# Patient Record
(demographics unavailable — no encounter records)

---

## 2024-11-19 NOTE — HISTORY OF PRESENT ILLNESS
[7] : 7 [Full time] : Work status: full time [de-identified] : 11/19/2024 Mr. 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 Experiencing sharp and tight pain for 2.5 months. Plays softball about 2x/wk, throws with RT arm. Difficulty with fully extending the right elbow.  Has tried Naproxen with no relief.   Denies n/t or pain radiating throughout the UE.  [] : no [FreeTextEntry1] : RT elbow  [de-identified] :

## 2024-11-19 NOTE — DISCUSSION/SUMMARY
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 1) start physical therapy 2) cryotherapy, rest and activity modification 3) if no improvement will eval with MRI    Entered by Allison Garcia acting as scribe. Dr. Wood- The documentation recorded by the scribe accurately reflects the service I personally performed and the decisions made by me.

## 2024-11-19 NOTE — PHYSICAL EXAM
[Right] : right elbow [NL (150)] : flexion 150 degrees [NL (90)] : supination 90 degrees [] : light touch intact [FreeTextEntry9] : 3 degrees extension